# Patient Record
Sex: MALE | Race: WHITE | Employment: FULL TIME | URBAN - METROPOLITAN AREA
[De-identification: names, ages, dates, MRNs, and addresses within clinical notes are randomized per-mention and may not be internally consistent; named-entity substitution may affect disease eponyms.]

---

## 2017-09-05 PROBLEM — Z98.1 S/P LUMBAR FUSION: Status: ACTIVE | Noted: 2017-09-05

## 2018-01-02 PROBLEM — E11.9 TYPE 2 DIABETES MELLITUS WITHOUT COMPLICATION, WITHOUT LONG-TERM CURRENT USE OF INSULIN (HCC): Status: ACTIVE | Noted: 2018-01-02

## 2019-06-27 RX ORDER — ASPIRIN 325 MG
650 TABLET ORAL DAILY
Status: ON HOLD | COMMUNITY
End: 2019-07-24

## 2019-07-23 ENCOUNTER — ANESTHESIA EVENT (OUTPATIENT)
Dept: SURGERY | Facility: HOSPITAL | Age: 59
DRG: 470 | End: 2019-07-23
Payer: COMMERCIAL

## 2019-07-23 NOTE — ANESTHESIA PREPROCEDURE EVALUATION
PRE-OP EVALUATION    Patient Name: Priya PARSONSJulia Demarco (Scott)    Pre-op Diagnosis: Primary osteoarthritis of right hip [M16.11]    Procedure(s):  RIGHT LATERAL TOTAL HIP REPLACEMENT    Surgeon(s) and Role:     Memo Johnson MD - Primary    Pre-op vitals revie Endo/Other      (+) diabetes                            Pulmonary    Negative pulmonary ROS.                        Neuro/Psych  Comment: Previous lumbar fusion, multiple lumbar ESIs    (+) depression                              Past Surgical Value Date    INR 1.0 07/19/2019         Airway      Mallampati: II  Mouth opening: >3 FB  TM distance: 4 - 6 cm  Neck ROM: full Cardiovascular    Cardiovascular exam normal.         Dental    No notable dental history.          Pulmonary    Pulmonary exam

## 2019-07-24 ENCOUNTER — HOSPITAL ENCOUNTER (INPATIENT)
Facility: HOSPITAL | Age: 59
LOS: 2 days | Discharge: HOME HEALTH | DRG: 470 | End: 2019-07-26
Attending: ORTHOPAEDIC SURGERY | Admitting: ORTHOPAEDIC SURGERY
Payer: COMMERCIAL

## 2019-07-24 ENCOUNTER — ANESTHESIA (OUTPATIENT)
Dept: SURGERY | Facility: HOSPITAL | Age: 59
DRG: 470 | End: 2019-07-24
Payer: COMMERCIAL

## 2019-07-24 ENCOUNTER — APPOINTMENT (OUTPATIENT)
Dept: GENERAL RADIOLOGY | Facility: HOSPITAL | Age: 59
DRG: 470 | End: 2019-07-24
Attending: PHYSICIAN ASSISTANT
Payer: COMMERCIAL

## 2019-07-24 DIAGNOSIS — M16.11 PRIMARY OSTEOARTHRITIS OF RIGHT HIP: ICD-10-CM

## 2019-07-24 LAB
ANTIBODY SCREEN: NEGATIVE
GLUCOSE BLD-MCNC: 178 MG/DL (ref 70–99)
GLUCOSE BLD-MCNC: 194 MG/DL (ref 70–99)
GLUCOSE BLD-MCNC: 314 MG/DL (ref 70–99)
RH BLOOD TYPE: NEGATIVE

## 2019-07-24 PROCEDURE — 82962 GLUCOSE BLOOD TEST: CPT

## 2019-07-24 PROCEDURE — 86850 RBC ANTIBODY SCREEN: CPT | Performed by: ORTHOPAEDIC SURGERY

## 2019-07-24 PROCEDURE — 88311 DECALCIFY TISSUE: CPT | Performed by: ORTHOPAEDIC SURGERY

## 2019-07-24 PROCEDURE — 3E0T3BZ INTRODUCTION OF ANESTHETIC AGENT INTO PERIPHERAL NERVES AND PLEXI, PERCUTANEOUS APPROACH: ICD-10-PCS | Performed by: ANESTHESIOLOGY

## 2019-07-24 PROCEDURE — 73501 X-RAY EXAM HIP UNI 1 VIEW: CPT | Performed by: PHYSICIAN ASSISTANT

## 2019-07-24 PROCEDURE — 0SR90JA REPLACEMENT OF RIGHT HIP JOINT WITH SYNTHETIC SUBSTITUTE, UNCEMENTED, OPEN APPROACH: ICD-10-PCS | Performed by: ORTHOPAEDIC SURGERY

## 2019-07-24 PROCEDURE — 86900 BLOOD TYPING SEROLOGIC ABO: CPT | Performed by: ORTHOPAEDIC SURGERY

## 2019-07-24 PROCEDURE — 88304 TISSUE EXAM BY PATHOLOGIST: CPT | Performed by: ORTHOPAEDIC SURGERY

## 2019-07-24 PROCEDURE — 86901 BLOOD TYPING SEROLOGIC RH(D): CPT | Performed by: ORTHOPAEDIC SURGERY

## 2019-07-24 PROCEDURE — 76942 ECHO GUIDE FOR BIOPSY: CPT | Performed by: ORTHOPAEDIC SURGERY

## 2019-07-24 RX ORDER — NALOXONE HYDROCHLORIDE 0.4 MG/ML
80 INJECTION, SOLUTION INTRAMUSCULAR; INTRAVENOUS; SUBCUTANEOUS AS NEEDED
Status: DISCONTINUED | OUTPATIENT
Start: 2019-07-24 | End: 2019-07-24 | Stop reason: HOSPADM

## 2019-07-24 RX ORDER — HYDROMORPHONE HYDROCHLORIDE 1 MG/ML
0.4 INJECTION, SOLUTION INTRAMUSCULAR; INTRAVENOUS; SUBCUTANEOUS EVERY 5 MIN PRN
Status: DISCONTINUED | OUTPATIENT
Start: 2019-07-24 | End: 2019-07-24 | Stop reason: HOSPADM

## 2019-07-24 RX ORDER — HYDROCODONE BITARTRATE AND ACETAMINOPHEN 5; 325 MG/1; MG/1
1 TABLET ORAL AS NEEDED
Status: DISCONTINUED | OUTPATIENT
Start: 2019-07-24 | End: 2019-07-24 | Stop reason: HOSPADM

## 2019-07-24 RX ORDER — ACETAMINOPHEN 325 MG/1
TABLET ORAL
Status: COMPLETED
Start: 2019-07-24 | End: 2019-07-24

## 2019-07-24 RX ORDER — OXYCODONE HYDROCHLORIDE 10 MG/1
10 TABLET ORAL EVERY 4 HOURS PRN
Status: DISPENSED | OUTPATIENT
Start: 2019-07-24 | End: 2019-07-26

## 2019-07-24 RX ORDER — DIPHENHYDRAMINE HYDROCHLORIDE 50 MG/ML
25 INJECTION INTRAMUSCULAR; INTRAVENOUS ONCE AS NEEDED
Status: ACTIVE | OUTPATIENT
Start: 2019-07-24 | End: 2019-07-24

## 2019-07-24 RX ORDER — ACETAMINOPHEN 325 MG/1
650 TABLET ORAL ONCE
Status: COMPLETED | OUTPATIENT
Start: 2019-07-24 | End: 2019-07-24

## 2019-07-24 RX ORDER — KETOROLAC TROMETHAMINE 30 MG/ML
30 INJECTION, SOLUTION INTRAMUSCULAR; INTRAVENOUS EVERY 6 HOURS
Status: COMPLETED | OUTPATIENT
Start: 2019-07-24 | End: 2019-07-25

## 2019-07-24 RX ORDER — SODIUM PHOSPHATE, DIBASIC AND SODIUM PHOSPHATE, MONOBASIC 7; 19 G/133ML; G/133ML
1 ENEMA RECTAL ONCE AS NEEDED
Status: DISCONTINUED | OUTPATIENT
Start: 2019-07-24 | End: 2019-07-26

## 2019-07-24 RX ORDER — DEXTROSE MONOHYDRATE 25 G/50ML
50 INJECTION, SOLUTION INTRAVENOUS
Status: DISCONTINUED | OUTPATIENT
Start: 2019-07-24 | End: 2019-07-26

## 2019-07-24 RX ORDER — BISACODYL 10 MG
10 SUPPOSITORY, RECTAL RECTAL
Status: DISCONTINUED | OUTPATIENT
Start: 2019-07-24 | End: 2019-07-26

## 2019-07-24 RX ORDER — METOCLOPRAMIDE HYDROCHLORIDE 5 MG/ML
10 INJECTION INTRAMUSCULAR; INTRAVENOUS EVERY 6 HOURS PRN
Status: DISCONTINUED | OUTPATIENT
Start: 2019-07-24 | End: 2019-07-26

## 2019-07-24 RX ORDER — POLYETHYLENE GLYCOL 3350 17 G/17G
17 POWDER, FOR SOLUTION ORAL DAILY PRN
Status: DISCONTINUED | OUTPATIENT
Start: 2019-07-24 | End: 2019-07-26

## 2019-07-24 RX ORDER — SODIUM CHLORIDE, SODIUM LACTATE, POTASSIUM CHLORIDE, CALCIUM CHLORIDE 600; 310; 30; 20 MG/100ML; MG/100ML; MG/100ML; MG/100ML
INJECTION, SOLUTION INTRAVENOUS CONTINUOUS
Status: DISCONTINUED | OUTPATIENT
Start: 2019-07-24 | End: 2019-07-24 | Stop reason: HOSPADM

## 2019-07-24 RX ORDER — MIDAZOLAM HYDROCHLORIDE 1 MG/ML
1 INJECTION INTRAMUSCULAR; INTRAVENOUS EVERY 5 MIN PRN
Status: DISCONTINUED | OUTPATIENT
Start: 2019-07-24 | End: 2019-07-24 | Stop reason: HOSPADM

## 2019-07-24 RX ORDER — HYDROMORPHONE HYDROCHLORIDE 1 MG/ML
0.8 INJECTION, SOLUTION INTRAMUSCULAR; INTRAVENOUS; SUBCUTANEOUS EVERY 2 HOUR PRN
Status: DISCONTINUED | OUTPATIENT
Start: 2019-07-24 | End: 2019-07-26

## 2019-07-24 RX ORDER — PROCHLORPERAZINE EDISYLATE 5 MG/ML
10 INJECTION INTRAMUSCULAR; INTRAVENOUS EVERY 6 HOURS PRN
Status: DISCONTINUED | OUTPATIENT
Start: 2019-07-24 | End: 2019-07-26

## 2019-07-24 RX ORDER — CELECOXIB 200 MG/1
200 CAPSULE ORAL 2 TIMES DAILY
Qty: 60 CAPSULE | Refills: 0 | Status: SHIPPED | OUTPATIENT
Start: 2019-07-24 | End: 2019-11-13 | Stop reason: ALTCHOICE

## 2019-07-24 RX ORDER — DIPHENHYDRAMINE HYDROCHLORIDE 50 MG/ML
12.5 INJECTION INTRAMUSCULAR; INTRAVENOUS EVERY 4 HOURS PRN
Status: DISCONTINUED | OUTPATIENT
Start: 2019-07-24 | End: 2019-07-26

## 2019-07-24 RX ORDER — ACETAMINOPHEN 325 MG/1
650 TABLET ORAL 4 TIMES DAILY
Status: DISPENSED | OUTPATIENT
Start: 2019-07-24 | End: 2019-07-26

## 2019-07-24 RX ORDER — SENNOSIDES 8.6 MG
17.2 TABLET ORAL NIGHTLY
Status: DISCONTINUED | OUTPATIENT
Start: 2019-07-24 | End: 2019-07-26

## 2019-07-24 RX ORDER — HYDROMORPHONE HYDROCHLORIDE 1 MG/ML
0.4 INJECTION, SOLUTION INTRAMUSCULAR; INTRAVENOUS; SUBCUTANEOUS EVERY 2 HOUR PRN
Status: DISCONTINUED | OUTPATIENT
Start: 2019-07-24 | End: 2019-07-26

## 2019-07-24 RX ORDER — VENLAFAXINE HYDROCHLORIDE 75 MG/1
75 CAPSULE, EXTENDED RELEASE ORAL 2 TIMES DAILY
COMMUNITY
End: 2020-01-20

## 2019-07-24 RX ORDER — INSULIN ASPART 100 [IU]/ML
INJECTION, SOLUTION INTRAVENOUS; SUBCUTANEOUS ONCE
Status: COMPLETED | OUTPATIENT
Start: 2019-07-24 | End: 2019-07-24

## 2019-07-24 RX ORDER — CEFAZOLIN SODIUM/WATER 2 G/20 ML
2 SYRINGE (ML) INTRAVENOUS ONCE
Status: COMPLETED | OUTPATIENT
Start: 2019-07-24 | End: 2019-07-24

## 2019-07-24 RX ORDER — ONDANSETRON 2 MG/ML
4 INJECTION INTRAMUSCULAR; INTRAVENOUS EVERY 4 HOURS PRN
Status: DISCONTINUED | OUTPATIENT
Start: 2019-07-24 | End: 2019-07-26

## 2019-07-24 RX ORDER — INSULIN ASPART 100 [IU]/ML
INJECTION, SOLUTION INTRAVENOUS; SUBCUTANEOUS
Status: COMPLETED
Start: 2019-07-24 | End: 2019-07-24

## 2019-07-24 RX ORDER — LISINOPRIL 10 MG/1
10 TABLET ORAL DAILY
COMMUNITY
End: 2019-11-19 | Stop reason: ALTCHOICE

## 2019-07-24 RX ORDER — SODIUM CHLORIDE, SODIUM LACTATE, POTASSIUM CHLORIDE, CALCIUM CHLORIDE 600; 310; 30; 20 MG/100ML; MG/100ML; MG/100ML; MG/100ML
INJECTION, SOLUTION INTRAVENOUS CONTINUOUS
Status: DISCONTINUED | OUTPATIENT
Start: 2019-07-24 | End: 2019-07-26

## 2019-07-24 RX ORDER — CLONAZEPAM 0.5 MG/1
0.5 TABLET ORAL NIGHTLY PRN
COMMUNITY
End: 2019-10-13

## 2019-07-24 RX ORDER — HYDROCODONE BITARTRATE AND ACETAMINOPHEN 5; 325 MG/1; MG/1
1 TABLET ORAL EVERY 6 HOURS PRN
Qty: 40 TABLET | Refills: 0 | Status: SHIPPED | OUTPATIENT
Start: 2019-07-24 | End: 2019-11-13 | Stop reason: ALTCHOICE

## 2019-07-24 RX ORDER — ATORVASTATIN CALCIUM 10 MG/1
10 TABLET, FILM COATED ORAL 2 TIMES DAILY
Status: DISCONTINUED | OUTPATIENT
Start: 2019-07-24 | End: 2019-07-26

## 2019-07-24 RX ORDER — DIPHENHYDRAMINE HCL 25 MG
25 CAPSULE ORAL EVERY 4 HOURS PRN
Status: DISCONTINUED | OUTPATIENT
Start: 2019-07-24 | End: 2019-07-26

## 2019-07-24 RX ORDER — ACETAMINOPHEN 500 MG
1000 TABLET ORAL EVERY 6 HOURS PRN
Status: ON HOLD | COMMUNITY
End: 2019-07-24

## 2019-07-24 RX ORDER — DEXTROSE MONOHYDRATE 25 G/50ML
50 INJECTION, SOLUTION INTRAVENOUS
Status: DISCONTINUED | OUTPATIENT
Start: 2019-07-24 | End: 2019-07-24 | Stop reason: HOSPADM

## 2019-07-24 RX ORDER — MEPERIDINE HYDROCHLORIDE 25 MG/ML
12.5 INJECTION INTRAMUSCULAR; INTRAVENOUS; SUBCUTANEOUS AS NEEDED
Status: DISCONTINUED | OUTPATIENT
Start: 2019-07-24 | End: 2019-07-24 | Stop reason: HOSPADM

## 2019-07-24 RX ORDER — OXYCODONE HYDROCHLORIDE 5 MG/1
5 TABLET ORAL EVERY 4 HOURS PRN
Status: DISPENSED | OUTPATIENT
Start: 2019-07-24 | End: 2019-07-26

## 2019-07-24 RX ORDER — ONDANSETRON 2 MG/ML
4 INJECTION INTRAMUSCULAR; INTRAVENOUS AS NEEDED
Status: DISCONTINUED | OUTPATIENT
Start: 2019-07-24 | End: 2019-07-24 | Stop reason: HOSPADM

## 2019-07-24 RX ORDER — CEFAZOLIN SODIUM/WATER 2 G/20 ML
2 SYRINGE (ML) INTRAVENOUS EVERY 8 HOURS
Status: COMPLETED | OUTPATIENT
Start: 2019-07-24 | End: 2019-07-25

## 2019-07-24 RX ORDER — SCOLOPAMINE TRANSDERMAL SYSTEM 1 MG/1
1 PATCH, EXTENDED RELEASE TRANSDERMAL ONCE
Status: DISCONTINUED | OUTPATIENT
Start: 2019-07-24 | End: 2019-07-26

## 2019-07-24 RX ORDER — OXYCODONE HYDROCHLORIDE 15 MG/1
15 TABLET ORAL EVERY 4 HOURS PRN
Status: ACTIVE | OUTPATIENT
Start: 2019-07-24 | End: 2019-07-26

## 2019-07-24 RX ORDER — CLONAZEPAM 0.5 MG/1
0.5 TABLET ORAL NIGHTLY PRN
Status: DISCONTINUED | OUTPATIENT
Start: 2019-07-24 | End: 2019-07-26

## 2019-07-24 RX ORDER — DOCUSATE SODIUM 100 MG/1
100 CAPSULE, LIQUID FILLED ORAL 2 TIMES DAILY
Status: DISCONTINUED | OUTPATIENT
Start: 2019-07-24 | End: 2019-07-26

## 2019-07-24 RX ORDER — ACETAMINOPHEN 500 MG
1000 TABLET ORAL ONCE
Status: DISCONTINUED | OUTPATIENT
Start: 2019-07-24 | End: 2019-07-24 | Stop reason: HOSPADM

## 2019-07-24 RX ORDER — HYDROMORPHONE HYDROCHLORIDE 1 MG/ML
0.2 INJECTION, SOLUTION INTRAMUSCULAR; INTRAVENOUS; SUBCUTANEOUS EVERY 2 HOUR PRN
Status: DISCONTINUED | OUTPATIENT
Start: 2019-07-24 | End: 2019-07-26

## 2019-07-24 RX ORDER — HYDROCODONE BITARTRATE AND ACETAMINOPHEN 5; 325 MG/1; MG/1
2 TABLET ORAL AS NEEDED
Status: DISCONTINUED | OUTPATIENT
Start: 2019-07-24 | End: 2019-07-24 | Stop reason: HOSPADM

## 2019-07-24 RX ORDER — SODIUM CHLORIDE 9 MG/ML
INJECTION, SOLUTION INTRAVENOUS CONTINUOUS
Status: DISCONTINUED | OUTPATIENT
Start: 2019-07-24 | End: 2019-07-26

## 2019-07-24 RX ORDER — MIDAZOLAM HYDROCHLORIDE 1 MG/ML
INJECTION INTRAMUSCULAR; INTRAVENOUS
Status: COMPLETED
Start: 2019-07-24 | End: 2019-07-24

## 2019-07-24 RX ORDER — TIZANIDINE 4 MG/1
4 TABLET ORAL 3 TIMES DAILY PRN
Status: DISCONTINUED | OUTPATIENT
Start: 2019-07-24 | End: 2019-07-26

## 2019-07-24 RX ORDER — LABETALOL HYDROCHLORIDE 5 MG/ML
5 INJECTION, SOLUTION INTRAVENOUS EVERY 5 MIN PRN
Status: DISCONTINUED | OUTPATIENT
Start: 2019-07-24 | End: 2019-07-24 | Stop reason: HOSPADM

## 2019-07-24 RX ORDER — DOCUSATE SODIUM 100 MG/1
100 CAPSULE, LIQUID FILLED ORAL 2 TIMES DAILY
Qty: 60 CAPSULE | Refills: 0 | Status: SHIPPED | OUTPATIENT
Start: 2019-07-24 | End: 2019-11-13 | Stop reason: ALTCHOICE

## 2019-07-24 NOTE — INTERVAL H&P NOTE
Pre-op Diagnosis: Primary osteoarthritis of right hip [M16.11]    The above referenced H&P was reviewed by Dora Jimenez MD on 7/24/2019, the patient was examined and no significant changes have occurred in the patient's condition since the H&P was performed

## 2019-07-24 NOTE — OPERATIVE REPORT
TOTAL HIP REPLACEMENT OPERATIVE REPORT    Galindo Demarco (Scott)       [id number]     12/16/1960      TOTAL HIP REPLACEMENT  PRE-OP DX:  RIGHT HIP PRIMARY OSTEOARTHRITIS  POST-OP DX:  RIGHT HIP PRIMARY OSTEOARTHRITIS  PROCEDURE: RIGHT  LATERAL TOTAL HIP REP ABDUCTION AND 15 DEG ANTEVERSION. IT WAS A STABLE FIT. OSTEOPHYTE WAS REMOVED. TRIAL LINER WAS PLACED. FEMORAL EXPOSURE WAS OBTAINED WITH A TROCHANTERIC RETRACTOR AND MEDIAL NECK RETRACTOR. A STARTING AWL WAS USED TO FIND THE STARTING HOLE.   Nika Montelongo

## 2019-07-24 NOTE — PROGRESS NOTES
Jean Demarco (Scott)   6/19/2019 2:50 PM   Office Visit   MRN:  [id number]   Description: 60year old male Provider: Rowan Boast, MD Department: [facility]   Scanning Cover Sheet     Click to print Cristhian Circe 852 for scanning   [facility] LISINOPRIL 10 MG Oral Tab TAKE 1 TABLET BY MOUTH EVERY DAY Disp: 90 tablet Rfl: 1   VENLAFAXINE HCL ER 75 MG Oral Capsule SR 24 Hr TAKE 1 CAPSULE(75 MG) BY MOUTH TWICE DAILY Disp: 180 capsule Rfl: 1   aspirin (SB LOW DOSE ASA EC) 81 MG Oral Tab EC Take 81 No distress. Alert and oriented. Right lower extremity:  He has abdominal pannus hanging over the anterior aspect of his hip. Hip skin is intact. No swelling, warmth, or erythema. No tenderness anywhere. Thigh is soft.   ROM: From 20 to 90 degrees o -     [address]

## 2019-07-24 NOTE — OR PREOP
Pt stated he forgot to stop his ASA 325mg until 7/22. He said he was taking 2 a day not one.  here now to talk to pt regarding risk of bleeding with not stopping the ASA. Pt decided to do the surgery despite the risk.

## 2019-07-25 LAB
DEPRECATED RDW RBC AUTO: 42.3 FL (ref 35.1–46.3)
ERYTHROCYTE [DISTWIDTH] IN BLOOD BY AUTOMATED COUNT: 13 % (ref 11–15)
EST. AVERAGE GLUCOSE BLD GHB EST-MCNC: 177 MG/DL (ref 68–126)
GLUCOSE BLD-MCNC: 190 MG/DL (ref 70–99)
GLUCOSE BLD-MCNC: 256 MG/DL (ref 70–99)
GLUCOSE BLD-MCNC: 306 MG/DL (ref 70–99)
GLUCOSE BLD-MCNC: 322 MG/DL (ref 70–99)
HBA1C MFR BLD HPLC: 7.8 % (ref ?–5.7)
HCT VFR BLD AUTO: 34.7 % (ref 39–53)
HGB BLD-MCNC: 11.6 G/DL (ref 13–17.5)
MCH RBC QN AUTO: 30.1 PG (ref 26–34)
MCHC RBC AUTO-ENTMCNC: 33.4 G/DL (ref 31–37)
MCV RBC AUTO: 90.1 FL (ref 80–100)
PLATELET # BLD AUTO: 184 10(3)UL (ref 150–450)
RBC # BLD AUTO: 3.85 X10(6)UL (ref 4.3–5.7)
WBC # BLD AUTO: 11.7 X10(3) UL (ref 4–11)

## 2019-07-25 PROCEDURE — 82962 GLUCOSE BLOOD TEST: CPT

## 2019-07-25 PROCEDURE — 97165 OT EVAL LOW COMPLEX 30 MIN: CPT

## 2019-07-25 PROCEDURE — 97535 SELF CARE MNGMENT TRAINING: CPT

## 2019-07-25 PROCEDURE — 97116 GAIT TRAINING THERAPY: CPT

## 2019-07-25 PROCEDURE — 97150 GROUP THERAPEUTIC PROCEDURES: CPT

## 2019-07-25 PROCEDURE — 83036 HEMOGLOBIN GLYCOSYLATED A1C: CPT | Performed by: HOSPITALIST

## 2019-07-25 PROCEDURE — 97161 PT EVAL LOW COMPLEX 20 MIN: CPT

## 2019-07-25 PROCEDURE — 85027 COMPLETE CBC AUTOMATED: CPT | Performed by: ORTHOPAEDIC SURGERY

## 2019-07-25 RX ORDER — LISINOPRIL 10 MG/1
10 TABLET ORAL DAILY
Status: DISCONTINUED | OUTPATIENT
Start: 2019-07-25 | End: 2019-07-26

## 2019-07-25 RX ORDER — HYDROCODONE BITARTRATE AND ACETAMINOPHEN 5; 325 MG/1; MG/1
2 TABLET ORAL EVERY 4 HOURS PRN
Status: DISCONTINUED | OUTPATIENT
Start: 2019-07-26 | End: 2019-07-26

## 2019-07-25 RX ORDER — HYDROCODONE BITARTRATE AND ACETAMINOPHEN 5; 325 MG/1; MG/1
1 TABLET ORAL EVERY 4 HOURS PRN
Status: DISCONTINUED | OUTPATIENT
Start: 2019-07-26 | End: 2019-07-26

## 2019-07-25 NOTE — HOME CARE LIAISON
MET WITH PTNT AND OFFERED CHOICE  OF AGENCIES. PTNT AGREEABLE TO [facility]. MET WITH PTNT TO DISCUSS HOME HEALTH SERVICES AND COVERAGE CRITERIA. PTNT AGREEABLE TO [facility]. PTNT GIVEN RESIDENTIAL BROCHURE.  RESIDENTIAL WITH PROVIDE SN/PT ON DISC

## 2019-07-25 NOTE — PHYSICAL THERAPY NOTE
PHYSICAL THERAPY HIP TREATMENT NOTE - INPATIENT      Room Number: 380/380-A     Session: 1&2            Problem List  Active Problems:    Primary osteoarthritis of right hip      Past Medical History   Past Medical History:   Diagnosis Date   • Anesthesia AFOs)    WEIGHT BEARING RESTRICTION  Weight Bearing Restriction: R lower extremity        R Lower Extremity: Weight Bearing as Tolerated       PAIN ASSESSMENT   Rating: Unable to rate  Location: R hip  Management Techniques: Activity promotion; Body mechani discomfort. PM: Pt performed seated and standing therex per PADMINI protocol with increased reps. Pt performed stair training, stoop  and car/tub t/f  training c cues for sequencing and CGA assist.  Pt able to recall 3/3 hip precautions.     Pt gait trained assistive device and supervision  Progressing, met at [place] [id number]    Goal #5     Patient verbalizes and/or demonstrates all precautions and safety concerns independently met    Goal #6           Goal Comments: Goals established on 7/25/2019

## 2019-07-25 NOTE — OCCUPATIONAL THERAPY NOTE
OCCUPATIONAL THERAPY QUICK EVALUATION - INPATIENT    Room Number: 380/380-A  Evaluation Date: 7/25/2019     Type of Evaluation: Quick Eval  Presenting Problem: s/p R PADMINI 7/24/19    Physician Order: IP Consult to Occupational Therapy  Reason for Therapy:  A TRANSFORAMINAL EPIDURAL - LUMBAR N/A 7/26/2012    Performed by Jacob Robertson MD at [facility]   • TRANSFORAMINAL EPIDURAL - LUMBAR N/A 7/11/2012    Performed by Jacob Robertson MD at [facility] body clothing?: None  -   Taking care of personal grooming such as brushing teeth?: None  -   Eating meals?: None    AM-PAC Score:  Score: 21  Approx Degree of Impairment: 32.79%  Standardized Score (AM-PAC Scale): 44.27  CMS Modifier (G-Code): DEISI SCHRADER review of medical or therapy records    Specific performance deficits impacting engagement in ADL/IADL  LOW  1 - 3 performance deficits    Client Assessment/Performance Deficits  LOW - No comorbidities nor modifications of tasks    Clinical Decision Making

## 2019-07-25 NOTE — PROGRESS NOTES
Orthopedic surgery progress note    Vilma Demarco (Scott) Patient Status:  Inpatient    1960 MRN [id number]   [facility] 3SW-A Attending Amarjit Saleh MD   Hosp Day # 1 PCP Sammie Gaines MD       Subjective:  No major complaints.

## 2019-07-25 NOTE — PLAN OF CARE
Vitals stable, pain well controlled on PO pain meds, A & O X 4, blood sugar elevated, on SS insulin, no insulin at home. Discussed s/s of hypoglycemia and etiology for elevated BS, verbalizes understanding. Jesus Foot drop, not new finding.   Pt reports he

## 2019-07-25 NOTE — PHYSICAL THERAPY NOTE
PHYSICAL THERAPY HIP EVALUATION - INPATIENT     Room Number: 380/380-A  Evaluation Date: 7/25/2019  Type of Evaluation: Initial  Physician Order: PT Eval and Treat    Presenting Problem: s/p anterior R PADMINI 7/24/19  Reason for Therapy: Mobility Dysfunction TRANSFORAMINAL EPIDURAL - LUMBAR N/A 7/11/2012    Performed by Shama Sparks MD at [address]  Type of Home: House   Home Layout: Two level  Stairs to Enter : 2  Railing: Yes  Stairs to Bedroom: 12  Railing:  Yes Turning over in bed (including adjusting bedclothes, sheets and blankets)?: None   -   Sitting down on and standing up from a chair with arms (e.g., wheelchair, bedside commode, etc.): None   -   Moving from lying on back to sitting on the side of the bed? training    Patient End of Session: Up in chair;Needs met;Call light within reach;RN aware of session/findings; All patient questions and concerns addressed    ASSESSMENT   Patient is a 60year old male admitted on 7/24/2019 for anterior R PADMINI.  Pertinent co will negotiate 4 stairs/one curb w/ assistive device and supervision   Goal #5    Patient verbalizes and/or demonstrates all precautions and safety concerns independently   Goal #6        Goal Comments: Goals established on 7/25/2019

## 2019-07-25 NOTE — PLAN OF CARE
Arrived via bed. Right hip dressing aquacel clean, dry, intact. Abductor pillow & gel ice pack in place. States history of bilateral foot drop due to back surgery,  unable to dorsi flex bilateral feet. Bilateral pedal pulses palpable. Due to void.   In

## 2019-07-25 NOTE — PROGRESS NOTES
Post Op Day 1 Ortho Note    Status Post Nerve Block:  Type of Nerve Block: Right Fascia Iliaca PADMINI  Single Injection Nerve Block    Post op review: No evidence of immediate block related complications, No paresthesia noted, Able to lift leg(s), Able to dorie

## 2019-07-25 NOTE — PLAN OF CARE
PT A/O, 98% ON 2L FOR NIGHT, 94% ON RA THIS AM, SCDS ON, VOIDING IN URINAL, TAKING SCHEDULED TYLENOL/TORADOL WITH GOOD PAIN CONTROL, DENIES N/T, SAME KRYSTINA FOOT DROP AS PREOP, AQUACEL DRESSING D/I, SL, ACCUCHECKS, STARTED ELIQUIS, INSTRUCTED PT ON POC    Pro

## 2019-07-25 NOTE — CM/SW NOTE
PT recs HHC at [place]. Pt referred to [facility] and they have accepted and pt has agreed to Silvio Finn. Kalina Chamberlain, [facility] Worker/Dischage Planner  [phone]  Pager [phone]

## 2019-07-25 NOTE — CONSULTS
General Medicine Consult      Reason for consult: medical management    Consulted by: Dr. Alyson Gamino    PCP: Emani Brown MD      History of Present Illness: Patient is a 60year old male with mmp including but not limited to HTN, DMII, anxiety/depressio Medications Marked as Taking for the 7/24/19 encounter [facility] Encounter):  lisinopril 10 MG Oral Tab Take 10 mg by mouth daily. Disp:  Rfl:    clonazePAM 0.5 MG Oral Tab Take 0.5 mg by mouth nightly as needed for Anxiety.  Disp:  Rfl:    Venlafaxine HCl docusate sodium  100 mg Oral BID   • apixaban  2.5 mg Oral BID   • mupirocin  1 Application Nasal BID   • atorvastatin  10 mg Oral BID   • Venlafaxine HCl ER  75 mg Oral BID   • Insulin Aspart Pen  1-5 Units Subcutaneous TID CC and HS     Continuous Infusi non-tender. Bowel sounds normal. Non distended, no overt hernias   Extremities: Extremities normal, atraumatic, no  edema. Skin: Skin color, texture, turgor normal. No visible rashes or lesions.     Neurologic:  Psychiatric: Has b/l foot droop (chronic)

## 2019-07-26 VITALS
OXYGEN SATURATION: 95 % | TEMPERATURE: 98 F | HEIGHT: 70 IN | HEART RATE: 84 BPM | DIASTOLIC BLOOD PRESSURE: 58 MMHG | RESPIRATION RATE: 17 BRPM | BODY MASS INDEX: 35.85 KG/M2 | SYSTOLIC BLOOD PRESSURE: 114 MMHG | WEIGHT: 250.44 LBS

## 2019-07-26 LAB
DEPRECATED RDW RBC AUTO: 44.3 FL (ref 35.1–46.3)
ERYTHROCYTE [DISTWIDTH] IN BLOOD BY AUTOMATED COUNT: 13.2 % (ref 11–15)
GLUCOSE BLD-MCNC: 205 MG/DL (ref 70–99)
HCT VFR BLD AUTO: 33.2 % (ref 39–53)
HGB BLD-MCNC: 11 G/DL (ref 13–17.5)
MCH RBC QN AUTO: 30.5 PG (ref 26–34)
MCHC RBC AUTO-ENTMCNC: 33.1 G/DL (ref 31–37)
MCV RBC AUTO: 92 FL (ref 80–100)
PLATELET # BLD AUTO: 144 10(3)UL (ref 150–450)
RBC # BLD AUTO: 3.61 X10(6)UL (ref 4.3–5.7)
WBC # BLD AUTO: 9 X10(3) UL (ref 4–11)

## 2019-07-26 PROCEDURE — 85027 COMPLETE CBC AUTOMATED: CPT | Performed by: ORTHOPAEDIC SURGERY

## 2019-07-26 PROCEDURE — 82962 GLUCOSE BLOOD TEST: CPT

## 2019-07-26 PROCEDURE — 97150 GROUP THERAPEUTIC PROCEDURES: CPT

## 2019-07-26 PROCEDURE — 97116 GAIT TRAINING THERAPY: CPT

## 2019-07-26 NOTE — PHYSICAL THERAPY NOTE
PHYSICAL THERAPY HIP TREATMENT NOTE - INPATIENT      Room Number: 380/380-A     Session: 3  Number of Visits to Meet Established Goals: 4    Presenting Problem: s/p anterior R PADMINI 7/24/19    Problem List  Active Problems:    Primary osteoarthritis of right anterior;Hip abduction pillow(B drop foot, doesn't wear AFOs)    WEIGHT BEARING RESTRICTION  Weight Bearing Restriction: R lower extremity        R Lower Extremity: Weight Bearing as Tolerated       PAIN ASSESSMENT   Rating: Unable to rate  Location: R helio verbalize and demonstrate all surgical precautions. Pt wheeled back to room, needs met.          Exercises AM Session    Ankle Pumps     20 reps    Quad Sets 20 reps    Glut Sets 20 reps    Hip Abd/Add 20 reps    Heel slides 20 reps    Saq 20 reps    Sitt demonstrates all precautions and safety concerns independently met    Goal #6           Goal Comments: Goals established on 7/25/2019  Adequate for d/c

## 2019-07-26 NOTE — PROGRESS NOTES
Post Op Day 2 Ortho Note: Right Lateral PADMINI    Assessed patient in chair. Rates pain 4/10 at rest and 5-6/10 with activity. Machelle Gonzalez is working well to manage pain; denies itching/nausea/dizziness.     Able to bear weight on sx leg; equal sensation in B

## 2019-07-26 NOTE — PLAN OF CARE
Pt ok to dc home with hh per ortho md & im md. Per [facility] set up. Ptp & wife verbalized understanding of poc & dc instructions written instructions given with rx for pain meds. Bm last night, refusing laxative, voiding in bathroom.  Up with min assist, t

## 2019-07-26 NOTE — CM/SW NOTE
[facility]  P:[phone]  F:[phone] by family car. Liaison aware.     Ashly Davis LCSW

## 2019-07-26 NOTE — PROGRESS NOTES
[facility] Hospitalist Progress Note     PCP: Anirudh Huynh MD    CC:  Follow up    SUBJECTIVE:  Pt sitting up in bed, pain controlled. No cp/sob/n/v/f/c. +U, +flatus.  Martha Terrazas for d/c    OBJECTIVE:  Temp:  [98.1 °F (36.7 °C)-98.8 °F (37.1 °C)] 98.1 °F (36.7 Insulin Aspart Pen  1-5 Units Subcutaneous TID CC and HS     • lactated ringers 20 mL/hr at 07/24/19 1211   • sodium chloride 125 mL/hr at 07/24/19 6137     HYDROcodone-acetaminophen **OR** HYDROcodone-acetaminophen, tiZANidine HCl, HYDROmorphone HCl **OR*

## 2019-07-26 NOTE — PLAN OF CARE
AOX4. Hx of numbness and foot drop on BLE d/t hx of back surgery x 3 in the past. Patient able to plantar flex yet unable to do dorsi flexion. Aquacel dressing is C/D/I, ice gel maintained. Is encouraged. SCDs maintained bilaterally. PT/OT.  Plan is to go h

## 2019-07-28 NOTE — DISCHARGE SUMMARY
Discharge Summary  Patient ID:  Manuel RHODESCullenRenzo Demarco  [id number]  60year old  12/16/1960    Admit date: 7/24/2019    Discharge date and time: 7/26/19    Attending Physician: No att. providers found     Reason for admission: right hip primary OA    Discha Capsule SR 24 Hr  Take 75 mg by mouth 2 (two) times daily. , Historical    mupirocin 2 % External Ointment  Use cotton swab to place a pea size amount of ointment. Apply topically to inside your nose twice daily. , Normal, Disp-15 g, R-0    SITagliptin Phos

## 2019-11-13 RX ORDER — SODIUM CHLORIDE, SODIUM LACTATE, POTASSIUM CHLORIDE, CALCIUM CHLORIDE 600; 310; 30; 20 MG/100ML; MG/100ML; MG/100ML; MG/100ML
INJECTION, SOLUTION INTRAVENOUS CONTINUOUS
Status: CANCELLED | OUTPATIENT
Start: 2019-11-13

## 2019-12-11 ENCOUNTER — ANESTHESIA EVENT (OUTPATIENT)
Dept: SURGERY | Facility: HOSPITAL | Age: 59
DRG: 455 | End: 2019-12-11
Payer: COMMERCIAL

## 2019-12-12 ENCOUNTER — ANESTHESIA (OUTPATIENT)
Dept: SURGERY | Facility: HOSPITAL | Age: 59
DRG: 455 | End: 2019-12-12
Payer: COMMERCIAL

## 2019-12-12 ENCOUNTER — APPOINTMENT (OUTPATIENT)
Dept: GENERAL RADIOLOGY | Facility: HOSPITAL | Age: 59
DRG: 455 | End: 2019-12-12
Attending: ORTHOPAEDIC SURGERY
Payer: COMMERCIAL

## 2019-12-12 ENCOUNTER — HOSPITAL ENCOUNTER (INPATIENT)
Facility: HOSPITAL | Age: 59
LOS: 2 days | Discharge: HOME | DRG: 455 | End: 2019-12-14
Attending: ORTHOPAEDIC SURGERY | Admitting: ORTHOPAEDIC SURGERY
Payer: COMMERCIAL

## 2019-12-12 DIAGNOSIS — M40.205 KYPHOSIS OF THORACOLUMBAR REGION, UNSPECIFIED KYPHOSIS TYPE: ICD-10-CM

## 2019-12-12 PROCEDURE — 95939 C MOTOR EVOKED UPR&LWR LIMBS: CPT | Performed by: ORTHOPAEDIC SURGERY

## 2019-12-12 PROCEDURE — 88300 SURGICAL PATH GROSS: CPT | Performed by: ORTHOPAEDIC SURGERY

## 2019-12-12 PROCEDURE — 0SP004Z REMOVAL OF INTERNAL FIXATION DEVICE FROM LUMBAR VERTEBRAL JOINT, OPEN APPROACH: ICD-10-PCS | Performed by: ORTHOPAEDIC SURGERY

## 2019-12-12 PROCEDURE — 0ST40ZZ RESECTION OF LUMBOSACRAL DISC, OPEN APPROACH: ICD-10-PCS | Performed by: ORTHOPAEDIC SURGERY

## 2019-12-12 PROCEDURE — 95861 NEEDLE EMG 2 EXTREMITIES: CPT | Performed by: ORTHOPAEDIC SURGERY

## 2019-12-12 PROCEDURE — 0SG1071 FUSION OF 2 OR MORE LUMBAR VERTEBRAL JOINTS WITH AUTOLOGOUS TISSUE SUBSTITUTE, POSTERIOR APPROACH, POSTERIOR COLUMN, OPEN APPROACH: ICD-10-PCS | Performed by: ORTHOPAEDIC SURGERY

## 2019-12-12 PROCEDURE — 95938 SOMATOSENSORY TESTING: CPT | Performed by: ORTHOPAEDIC SURGERY

## 2019-12-12 PROCEDURE — 82962 GLUCOSE BLOOD TEST: CPT

## 2019-12-12 PROCEDURE — 95940 IONM IN OPERATNG ROOM 15 MIN: CPT | Performed by: ORTHOPAEDIC SURGERY

## 2019-12-12 PROCEDURE — 01NB0ZZ RELEASE LUMBAR NERVE, OPEN APPROACH: ICD-10-PCS | Performed by: ORTHOPAEDIC SURGERY

## 2019-12-12 PROCEDURE — 76000 FLUOROSCOPY <1 HR PHYS/QHP: CPT | Performed by: ORTHOPAEDIC SURGERY

## 2019-12-12 PROCEDURE — 4A11X4G MONITORING OF PERIPHERAL NERVOUS ELECTRICAL ACTIVITY, INTRAOPERATIVE, EXTERNAL APPROACH: ICD-10-PCS | Performed by: ORTHOPAEDIC SURGERY

## 2019-12-12 PROCEDURE — 0SG3071 FUSION OF LUMBOSACRAL JOINT WITH AUTOLOGOUS TISSUE SUBSTITUTE, POSTERIOR APPROACH, POSTERIOR COLUMN, OPEN APPROACH: ICD-10-PCS | Performed by: ORTHOPAEDIC SURGERY

## 2019-12-12 PROCEDURE — 0SP304Z REMOVAL OF INTERNAL FIXATION DEVICE FROM LUMBOSACRAL JOINT, OPEN APPROACH: ICD-10-PCS | Performed by: ORTHOPAEDIC SURGERY

## 2019-12-12 PROCEDURE — 0SG10A0 FUSION OF 2 OR MORE LUMBAR VERTEBRAL JOINTS WITH INTERBODY FUSION DEVICE, ANTERIOR APPROACH, ANTERIOR COLUMN, OPEN APPROACH: ICD-10-PCS | Performed by: ORTHOPAEDIC SURGERY

## 2019-12-12 PROCEDURE — 01NR0ZZ RELEASE SACRAL NERVE, OPEN APPROACH: ICD-10-PCS | Performed by: ORTHOPAEDIC SURGERY

## 2019-12-12 PROCEDURE — 0ST20ZZ RESECTION OF LUMBAR VERTEBRAL DISC, OPEN APPROACH: ICD-10-PCS | Performed by: ORTHOPAEDIC SURGERY

## 2019-12-12 DEVICE — BONE GRAFT KIT 7510200 INFUSE SMALL
Type: IMPLANTABLE DEVICE | Status: FUNCTIONAL
Brand: INFUSE® BONE GRAFT

## 2019-12-12 DEVICE — RELINE MAS COCR ROD 5.5X300 ST: Type: IMPLANTABLE DEVICE | Site: BACK | Status: FUNCTIONAL

## 2019-12-12 DEVICE — IMPLANTABLE DEVICE: Type: IMPLANTABLE DEVICE | Site: BACK | Status: FUNCTIONAL

## 2019-12-12 DEVICE — RELINE LCK SCRW 5.5 OPEN TULIP: Type: IMPLANTABLE DEVICE | Site: BACK | Status: FUNCTIONAL

## 2019-12-12 DEVICE — OSTEOCEL PRO LARGE BULK BUY: Type: IMPLANTABLE DEVICE | Status: FUNCTIONAL

## 2019-12-12 DEVICE — RELINE MAS RED SCREW 7.5X50 2C: Type: IMPLANTABLE DEVICE | Site: BACK | Status: FUNCTIONAL

## 2019-12-12 DEVICE — RELINE MAS RED SCRW 6.5X50 2C: Type: IMPLANTABLE DEVICE | Site: BACK | Status: FUNCTIONAL

## 2019-12-12 RX ORDER — INSULIN ASPART 100 [IU]/ML
INJECTION, SOLUTION INTRAVENOUS; SUBCUTANEOUS
Status: DISCONTINUED
Start: 2019-12-12 | End: 2019-12-12 | Stop reason: WASHOUT

## 2019-12-12 RX ORDER — DEXTROSE MONOHYDRATE 25 G/50ML
50 INJECTION, SOLUTION INTRAVENOUS
Status: DISCONTINUED | OUTPATIENT
Start: 2019-12-12 | End: 2019-12-14

## 2019-12-12 RX ORDER — CEFAZOLIN SODIUM/WATER 2 G/20 ML
2 SYRINGE (ML) INTRAVENOUS EVERY 8 HOURS
Status: COMPLETED | OUTPATIENT
Start: 2019-12-12 | End: 2019-12-13

## 2019-12-12 RX ORDER — HYDROMORPHONE HYDROCHLORIDE 1 MG/ML
0.8 INJECTION, SOLUTION INTRAMUSCULAR; INTRAVENOUS; SUBCUTANEOUS EVERY 2 HOUR PRN
Status: DISCONTINUED | OUTPATIENT
Start: 2019-12-12 | End: 2019-12-14

## 2019-12-12 RX ORDER — ATORVASTATIN CALCIUM 10 MG/1
20 TABLET, FILM COATED ORAL NIGHTLY
COMMUNITY
End: 2020-03-09

## 2019-12-12 RX ORDER — LISINOPRIL 10 MG/1
10 TABLET ORAL DAILY
Status: DISCONTINUED | OUTPATIENT
Start: 2019-12-13 | End: 2019-12-14

## 2019-12-12 RX ORDER — BISACODYL 10 MG
10 SUPPOSITORY, RECTAL RECTAL
Status: DISCONTINUED | OUTPATIENT
Start: 2019-12-12 | End: 2019-12-14

## 2019-12-12 RX ORDER — OXYCODONE HYDROCHLORIDE 10 MG/1
10 TABLET ORAL EVERY 4 HOURS PRN
Status: DISCONTINUED | OUTPATIENT
Start: 2019-12-12 | End: 2019-12-13

## 2019-12-12 RX ORDER — DEXTROSE MONOHYDRATE 25 G/50ML
50 INJECTION, SOLUTION INTRAVENOUS
Status: DISCONTINUED | OUTPATIENT
Start: 2019-12-12 | End: 2019-12-12 | Stop reason: HOSPADM

## 2019-12-12 RX ORDER — BACITRACIN 50000 [USP'U]/1
INJECTION, POWDER, LYOPHILIZED, FOR SOLUTION INTRAMUSCULAR AS NEEDED
Status: DISCONTINUED | OUTPATIENT
Start: 2019-12-12 | End: 2019-12-12 | Stop reason: HOSPADM

## 2019-12-12 RX ORDER — VENLAFAXINE HYDROCHLORIDE 75 MG/1
75 CAPSULE, EXTENDED RELEASE ORAL 2 TIMES DAILY
Status: DISCONTINUED | OUTPATIENT
Start: 2019-12-12 | End: 2019-12-14

## 2019-12-12 RX ORDER — CELECOXIB 200 MG/1
CAPSULE ORAL
Status: DISPENSED
Start: 2019-12-12 | End: 2019-12-12

## 2019-12-12 RX ORDER — HYDROMORPHONE HYDROCHLORIDE 1 MG/ML
0.2 INJECTION, SOLUTION INTRAMUSCULAR; INTRAVENOUS; SUBCUTANEOUS EVERY 2 HOUR PRN
Status: DISCONTINUED | OUTPATIENT
Start: 2019-12-12 | End: 2019-12-14

## 2019-12-12 RX ORDER — DOCUSATE SODIUM 100 MG/1
100 CAPSULE, LIQUID FILLED ORAL 2 TIMES DAILY
Status: DISCONTINUED | OUTPATIENT
Start: 2019-12-12 | End: 2019-12-14

## 2019-12-12 RX ORDER — ONDANSETRON 2 MG/ML
INJECTION INTRAMUSCULAR; INTRAVENOUS
Status: DISPENSED
Start: 2019-12-12 | End: 2019-12-12

## 2019-12-12 RX ORDER — CEFAZOLIN SODIUM/WATER 2 G/20 ML
2 SYRINGE (ML) INTRAVENOUS ONCE
Status: COMPLETED | OUTPATIENT
Start: 2019-12-12 | End: 2019-12-12

## 2019-12-12 RX ORDER — ONDANSETRON 2 MG/ML
4 INJECTION INTRAMUSCULAR; INTRAVENOUS ONCE
Status: COMPLETED | OUTPATIENT
Start: 2019-12-12 | End: 2019-12-12

## 2019-12-12 RX ORDER — HYDROMORPHONE HYDROCHLORIDE 1 MG/ML
0.4 INJECTION, SOLUTION INTRAMUSCULAR; INTRAVENOUS; SUBCUTANEOUS EVERY 2 HOUR PRN
Status: DISCONTINUED | OUTPATIENT
Start: 2019-12-12 | End: 2019-12-14

## 2019-12-12 RX ORDER — BUPIVACAINE HYDROCHLORIDE AND EPINEPHRINE 5; 5 MG/ML; UG/ML
INJECTION, SOLUTION EPIDURAL; INTRACAUDAL; PERINEURAL AS NEEDED
Status: DISCONTINUED | OUTPATIENT
Start: 2019-12-12 | End: 2019-12-12 | Stop reason: HOSPADM

## 2019-12-12 RX ORDER — SODIUM CHLORIDE 9 MG/ML
INJECTION, SOLUTION INTRAVENOUS CONTINUOUS
Status: DISCONTINUED | OUTPATIENT
Start: 2019-12-12 | End: 2019-12-14

## 2019-12-12 RX ORDER — ATORVASTATIN CALCIUM 20 MG/1
20 TABLET, FILM COATED ORAL NIGHTLY
Status: DISCONTINUED | OUTPATIENT
Start: 2019-12-12 | End: 2019-12-14

## 2019-12-12 RX ORDER — LISINOPRIL 10 MG/1
10 TABLET ORAL DAILY
COMMUNITY
End: 2020-01-19

## 2019-12-12 RX ORDER — ONDANSETRON 2 MG/ML
4 INJECTION INTRAMUSCULAR; INTRAVENOUS AS NEEDED
Status: DISCONTINUED | OUTPATIENT
Start: 2019-12-12 | End: 2019-12-12 | Stop reason: HOSPADM

## 2019-12-12 RX ORDER — CEFAZOLIN SODIUM/WATER 2 G/20 ML
SYRINGE (ML) INTRAVENOUS
Status: DISPENSED
Start: 2019-12-12 | End: 2019-12-12

## 2019-12-12 RX ORDER — CELECOXIB 200 MG/1
200 CAPSULE ORAL ONCE
Status: COMPLETED | OUTPATIENT
Start: 2019-12-12 | End: 2019-12-12

## 2019-12-12 RX ORDER — MORPHINE SULFATE 15 MG/1
15 TABLET ORAL EVERY 4 HOURS PRN
Status: DISCONTINUED | OUTPATIENT
Start: 2019-12-12 | End: 2019-12-13

## 2019-12-12 RX ORDER — DIPHENHYDRAMINE HYDROCHLORIDE 50 MG/ML
25 INJECTION INTRAMUSCULAR; INTRAVENOUS EVERY 4 HOURS PRN
Status: DISCONTINUED | OUTPATIENT
Start: 2019-12-12 | End: 2019-12-14

## 2019-12-12 RX ORDER — INSULIN ASPART 100 [IU]/ML
INJECTION, SOLUTION INTRAVENOUS; SUBCUTANEOUS ONCE
Status: COMPLETED | OUTPATIENT
Start: 2019-12-12 | End: 2019-12-12

## 2019-12-12 RX ORDER — DIAZEPAM 5 MG/1
5 TABLET ORAL EVERY 6 HOURS PRN
Status: DISCONTINUED | OUTPATIENT
Start: 2019-12-12 | End: 2019-12-14

## 2019-12-12 RX ORDER — HYDROMORPHONE HYDROCHLORIDE 1 MG/ML
INJECTION, SOLUTION INTRAMUSCULAR; INTRAVENOUS; SUBCUTANEOUS AS NEEDED
Status: DISCONTINUED | OUTPATIENT
Start: 2019-12-12 | End: 2019-12-12 | Stop reason: SURG

## 2019-12-12 RX ORDER — GABAPENTIN 600 MG/1
TABLET ORAL
Status: DISPENSED
Start: 2019-12-12 | End: 2019-12-12

## 2019-12-12 RX ORDER — SODIUM CHLORIDE, SODIUM LACTATE, POTASSIUM CHLORIDE, CALCIUM CHLORIDE 600; 310; 30; 20 MG/100ML; MG/100ML; MG/100ML; MG/100ML
INJECTION, SOLUTION INTRAVENOUS CONTINUOUS
Status: DISCONTINUED | OUTPATIENT
Start: 2019-12-12 | End: 2019-12-12 | Stop reason: HOSPADM

## 2019-12-12 RX ORDER — HYDROMORPHONE HYDROCHLORIDE 1 MG/ML
INJECTION, SOLUTION INTRAMUSCULAR; INTRAVENOUS; SUBCUTANEOUS
Status: COMPLETED
Start: 2019-12-12 | End: 2019-12-12

## 2019-12-12 RX ORDER — DEXAMETHASONE SODIUM PHOSPHATE 10 MG/ML
10 INJECTION, SOLUTION INTRAMUSCULAR; INTRAVENOUS EVERY 8 HOURS
Status: CANCELLED | OUTPATIENT
Start: 2019-12-12 | End: 2019-12-13

## 2019-12-12 RX ORDER — TIZANIDINE 4 MG/1
4 TABLET ORAL 3 TIMES DAILY PRN
Status: DISCONTINUED | OUTPATIENT
Start: 2019-12-12 | End: 2019-12-14

## 2019-12-12 RX ORDER — SODIUM PHOSPHATE, DIBASIC AND SODIUM PHOSPHATE, MONOBASIC 7; 19 G/133ML; G/133ML
1 ENEMA RECTAL ONCE AS NEEDED
Status: DISCONTINUED | OUTPATIENT
Start: 2019-12-12 | End: 2019-12-14

## 2019-12-12 RX ORDER — LIDOCAINE HYDROCHLORIDE 10 MG/ML
INJECTION, SOLUTION EPIDURAL; INFILTRATION; INTRACAUDAL; PERINEURAL AS NEEDED
Status: DISCONTINUED | OUTPATIENT
Start: 2019-12-12 | End: 2019-12-12 | Stop reason: SURG

## 2019-12-12 RX ORDER — OXYCODONE HYDROCHLORIDE 5 MG/1
5 TABLET ORAL EVERY 4 HOURS PRN
Status: DISCONTINUED | OUTPATIENT
Start: 2019-12-12 | End: 2019-12-13

## 2019-12-12 RX ORDER — SODIUM CHLORIDE, SODIUM LACTATE, POTASSIUM CHLORIDE, CALCIUM CHLORIDE 600; 310; 30; 20 MG/100ML; MG/100ML; MG/100ML; MG/100ML
INJECTION, SOLUTION INTRAVENOUS CONTINUOUS
Status: DISCONTINUED | OUTPATIENT
Start: 2019-12-12 | End: 2019-12-14

## 2019-12-12 RX ORDER — DIPHENHYDRAMINE HCL 25 MG
25 CAPSULE ORAL EVERY 4 HOURS PRN
Status: DISCONTINUED | OUTPATIENT
Start: 2019-12-12 | End: 2019-12-14

## 2019-12-12 RX ORDER — METOCLOPRAMIDE HYDROCHLORIDE 5 MG/ML
10 INJECTION INTRAMUSCULAR; INTRAVENOUS EVERY 6 HOURS PRN
Status: DISCONTINUED | OUTPATIENT
Start: 2019-12-12 | End: 2019-12-14

## 2019-12-12 RX ORDER — KETAMINE HYDROCHLORIDE 50 MG/ML
INJECTION, SOLUTION, CONCENTRATE INTRAMUSCULAR; INTRAVENOUS AS NEEDED
Status: DISCONTINUED | OUTPATIENT
Start: 2019-12-12 | End: 2019-12-12 | Stop reason: SURG

## 2019-12-12 RX ORDER — ONDANSETRON 2 MG/ML
INJECTION INTRAMUSCULAR; INTRAVENOUS AS NEEDED
Status: DISCONTINUED | OUTPATIENT
Start: 2019-12-12 | End: 2019-12-12 | Stop reason: SURG

## 2019-12-12 RX ORDER — NALOXONE HYDROCHLORIDE 0.4 MG/ML
80 INJECTION, SOLUTION INTRAMUSCULAR; INTRAVENOUS; SUBCUTANEOUS AS NEEDED
Status: DISCONTINUED | OUTPATIENT
Start: 2019-12-12 | End: 2019-12-12 | Stop reason: HOSPADM

## 2019-12-12 RX ORDER — HYDROMORPHONE HYDROCHLORIDE 1 MG/ML
0.4 INJECTION, SOLUTION INTRAMUSCULAR; INTRAVENOUS; SUBCUTANEOUS EVERY 5 MIN PRN
Status: DISCONTINUED | OUTPATIENT
Start: 2019-12-12 | End: 2019-12-12 | Stop reason: HOSPADM

## 2019-12-12 RX ORDER — MIDAZOLAM HYDROCHLORIDE 1 MG/ML
INJECTION INTRAMUSCULAR; INTRAVENOUS AS NEEDED
Status: DISCONTINUED | OUTPATIENT
Start: 2019-12-12 | End: 2019-12-12 | Stop reason: SURG

## 2019-12-12 RX ORDER — CLONAZEPAM 0.5 MG/1
0.5 TABLET ORAL NIGHTLY PRN
Status: DISCONTINUED | OUTPATIENT
Start: 2019-12-12 | End: 2019-12-14

## 2019-12-12 RX ORDER — PHENYLEPHRINE HCL 10 MG/ML
VIAL (ML) INJECTION AS NEEDED
Status: DISCONTINUED | OUTPATIENT
Start: 2019-12-12 | End: 2019-12-12 | Stop reason: SURG

## 2019-12-12 RX ORDER — POLYETHYLENE GLYCOL 3350 17 G/17G
17 POWDER, FOR SOLUTION ORAL DAILY PRN
Status: DISCONTINUED | OUTPATIENT
Start: 2019-12-12 | End: 2019-12-14

## 2019-12-12 RX ORDER — GABAPENTIN 600 MG/1
600 TABLET ORAL ONCE
Status: COMPLETED | OUTPATIENT
Start: 2019-12-12 | End: 2019-12-12

## 2019-12-12 RX ORDER — ACETAMINOPHEN 500 MG
1000 TABLET ORAL ONCE
Status: DISCONTINUED | OUTPATIENT
Start: 2019-12-12 | End: 2019-12-12 | Stop reason: HOSPADM

## 2019-12-12 RX ORDER — ONDANSETRON 2 MG/ML
4 INJECTION INTRAMUSCULAR; INTRAVENOUS EVERY 4 HOURS PRN
Status: ACTIVE | OUTPATIENT
Start: 2019-12-12 | End: 2019-12-13

## 2019-12-12 RX ORDER — VANCOMYCIN HYDROCHLORIDE 1 G/20ML
INJECTION, POWDER, LYOPHILIZED, FOR SOLUTION INTRAVENOUS AS NEEDED
Status: DISCONTINUED | OUTPATIENT
Start: 2019-12-12 | End: 2019-12-12 | Stop reason: HOSPADM

## 2019-12-12 RX ORDER — DEXAMETHASONE SODIUM PHOSPHATE 4 MG/ML
VIAL (ML) INJECTION AS NEEDED
Status: DISCONTINUED | OUTPATIENT
Start: 2019-12-12 | End: 2019-12-12 | Stop reason: SURG

## 2019-12-12 RX ADMIN — ONDANSETRON 4 MG: 2 INJECTION INTRAMUSCULAR; INTRAVENOUS at 12:06:00

## 2019-12-12 RX ADMIN — PHENYLEPHRINE HCL 100 MCG: 10 MG/ML VIAL (ML) INJECTION at 09:05:00

## 2019-12-12 RX ADMIN — CEFAZOLIN SODIUM/WATER 2 G: 2 G/20 ML SYRINGE (ML) INTRAVENOUS at 07:56:00

## 2019-12-12 RX ADMIN — LIDOCAINE HYDROCHLORIDE 25 MG: 10 INJECTION, SOLUTION EPIDURAL; INFILTRATION; INTRACAUDAL; PERINEURAL at 07:36:00

## 2019-12-12 RX ADMIN — SODIUM CHLORIDE, SODIUM LACTATE, POTASSIUM CHLORIDE, CALCIUM CHLORIDE: 600; 310; 30; 20 INJECTION, SOLUTION INTRAVENOUS at 12:42:00

## 2019-12-12 RX ADMIN — HYDROMORPHONE HYDROCHLORIDE 0.5 MG: 1 INJECTION, SOLUTION INTRAMUSCULAR; INTRAVENOUS; SUBCUTANEOUS at 07:46:00

## 2019-12-12 RX ADMIN — MIDAZOLAM HYDROCHLORIDE 2 MG: 1 INJECTION INTRAMUSCULAR; INTRAVENOUS at 07:34:00

## 2019-12-12 RX ADMIN — HYDROMORPHONE HYDROCHLORIDE 0.5 MG: 1 INJECTION, SOLUTION INTRAMUSCULAR; INTRAVENOUS; SUBCUTANEOUS at 10:40:00

## 2019-12-12 RX ADMIN — DEXAMETHASONE SODIUM PHOSPHATE 8 MG: 4 MG/ML VIAL (ML) INJECTION at 08:06:00

## 2019-12-12 RX ADMIN — CEFAZOLIN SODIUM/WATER 2 G: 2 G/20 ML SYRINGE (ML) INTRAVENOUS at 11:56:00

## 2019-12-12 RX ADMIN — KETAMINE HYDROCHLORIDE 50 MG: 50 INJECTION, SOLUTION, CONCENTRATE INTRAMUSCULAR; INTRAVENOUS at 08:09:00

## 2019-12-12 RX ADMIN — HYDROMORPHONE HYDROCHLORIDE 0.5 MG: 1 INJECTION, SOLUTION INTRAMUSCULAR; INTRAVENOUS; SUBCUTANEOUS at 07:38:00

## 2019-12-12 NOTE — ANESTHESIA POSTPROCEDURE EVALUATION
[address] [facility] Patient Status:  Surgery Admit - Inpt   Age/Gender 60year old male MRN [id number]   [facility] SURGERY Attending Kehinde Solano MD   Hosp Day # 0 PCP Silvina Nicholas MD       Anesthesia Post-op Note    Pro

## 2019-12-12 NOTE — ANESTHESIA PROCEDURE NOTES
Peripheral IV  Date/Time: 12/12/2019 8:11 AM  Inserted by: Debi Benitez MD    Placement  Needle size: 16 G  Laterality: right  Location: hand  Local anesthetic: none  Site prep: alcohol  Technique: anatomical landmarks  Attempts: 1

## 2019-12-12 NOTE — PROGRESS NOTES
S: Moderate back pain with no leg pain. No new numbness or weakness. No headaches. Inspection:  Awake alert No acute distress.  No difficulty breathing     Blood pressure 124/73, pulse 96, temperature 97.1 °F (36.2 °C), temperature source Temporal, r

## 2019-12-12 NOTE — PLAN OF CARE
Pt A & O x4 on 3L O2, /IS. SCDs, Gillis, ADA diet, Foam dressing CDI. Ice therapy. Last BM 12/12. IVF. Accu checks [facility]. Pt has footdrop in both feet, c/o numbness in L hand and bilateral feet, left leg knee down to foot. Family at bedside.  UPdated on P

## 2019-12-12 NOTE — ANESTHESIA PROCEDURE NOTES
Airway  Date/Time: 12/12/2019 7:40 AM  Urgency: elective      General Information and Staff    Patient location during procedure: OR  Anesthesiologist: Gurdeep Cheema MD    Indications and Patient Condition  Indications for airway management: anesth

## 2019-12-12 NOTE — H&P
Patient presents with:  Pre-Op Exam: pt is scheduled for  back  surgery on 12/12       ________________________________________________________________________  HPI:  Planned lumbar fusion  - 12/12/2019    Dr Jacques hanks s/p Hip replace INJECTION OR MEDIAL BRANCH NERVE BLOCK N/A 9/11/2012     Performed by Mary Huff MD at [address]   • OTHER         elbow and carpal tunnel    both arms   • OTHER   2014     back surgery   • TRANSFORAMINAL EPIDURAL - LUMBAR N/A 8/1 monthly        Comment: social on weekends       Drug use:  No     ________________________________________________________________________  ROS:  GENERAL HEALTH: feels well otherwise  Tolerated previous anesthesia      SKIN: denies any unusual skin lesions replacement, right  Stable        Patient is medically stable and cleared for surgery  -         The above referenced H&P was reviewed by Asmita Lu MD on 12/12/2019, and no significant changes have occurred in the patient's condition since the H&P was

## 2019-12-12 NOTE — ANESTHESIA PREPROCEDURE EVALUATION
PRE-OP EVALUATION    Patient Name: Chase Morgan    Pre-op Diagnosis: Kyphosis of thoracolumbar region, unspecified kyphosis type [M40.205]    Procedure(s):  Lumbar 2-Lumbar 3, Lumbar 3, Lumbar 4 elly-lateral fusion with Lumbar 2-Lumbar 3, Lumbar 3-Lumb NEEDED FOR ANXIETY, Disp: 90 tablet, Rfl: 0  metFORMIN HCl ER, MOD, 1000 MG (MOD) Oral Tablet 24 Hr, Take 2 tablets (2,000 mg total) by mouth 2 (two) times daily with meals.  May split into twice daily dose as tolerated, Disp: 180 tablet, Rfl: 3  ATORVASTAT MEDIAL BRANCH NERVE BLOCK N/A 9/11/2012    Performed by Marly Zambrano MD at [address]   • OTHER      elbow and carpal tunnel    both arms   • OTHER  2014    back surgery   • TRANSFORAMINAL EPIDURAL - LUMBAR N/A 8/16/2012    Performed

## 2019-12-12 NOTE — ANESTHESIA PROCEDURE NOTES
Arterial Line  Performed by: Dano Sánchez MD  Authorized by: Dano Sánchez MD     General Information and Staff    Procedure Start:  12/12/2019 8:35 AM  Procedure End:  12/12/2019 8:38 AM  Anesthesiologist:  Dano Sánchez MD  Per

## 2019-12-12 NOTE — CONSULTS
General Medicine Consult      Reason for consult: medical management    Consulted by: Dr. Norberto Ching    PCP: Rishabh Velazco MD      History of Present Illness: Patient is a 60year old male with mmp including but not limited to HTN, DM, diabetes, OA, is c EPIDURAL - LUMBAR N/A 7/11/2012    Performed by Maria Del Rosario Menezes MD at [facility] Medications:  Acetaminophen (ACETAMINOPHEN EXTRA STRENGTH) 500 MG Oral Cap, Take 1,000 mg by mouth one time.   , Disp: , Rfl:   atorvastatin 10 History    Tobacco Use      Smoking status: Never Smoker      Smokeless tobacco: Never Used    Alcohol use: Yes      Frequency: 2-3 times a week      Drinks per session: 3 or 4      Binge frequency: Less than monthly      Comment: social on weekends monitor    **PPx-scds, no ppx AC given recent spine surgery        Outpatient records or previous hospital records reviewed. Further recommendations pending patient's clinical course.   [facility] hospitalist to continue to follow patient while in house    Marcio Ji

## 2019-12-12 NOTE — BRIEF OP NOTE
Pre-Operative Diagnosis: Kyphosis of thoracolumbar region, unspecified kyphosis type [M40.205]     Post-Operative Diagnosis: Kyphosis of thoracolumbar region, unspecified kyphosis type [M40.205]      Procedure Performed:   Procedure(s):  Lumbar 2-Lumbar 3,

## 2019-12-13 PROCEDURE — 82962 GLUCOSE BLOOD TEST: CPT

## 2019-12-13 PROCEDURE — 85027 COMPLETE CBC AUTOMATED: CPT | Performed by: PHYSICIAN ASSISTANT

## 2019-12-13 PROCEDURE — 97535 SELF CARE MNGMENT TRAINING: CPT

## 2019-12-13 PROCEDURE — 97530 THERAPEUTIC ACTIVITIES: CPT

## 2019-12-13 PROCEDURE — 97165 OT EVAL LOW COMPLEX 30 MIN: CPT

## 2019-12-13 PROCEDURE — 97161 PT EVAL LOW COMPLEX 20 MIN: CPT

## 2019-12-13 RX ORDER — HYDROCODONE BITARTRATE AND ACETAMINOPHEN 10; 325 MG/1; MG/1
1 TABLET ORAL EVERY 6 HOURS PRN
Status: DISCONTINUED | OUTPATIENT
Start: 2019-12-13 | End: 2019-12-14

## 2019-12-13 RX ORDER — HYDROCODONE BITARTRATE AND ACETAMINOPHEN 10; 325 MG/1; MG/1
2 TABLET ORAL EVERY 6 HOURS PRN
Status: DISCONTINUED | OUTPATIENT
Start: 2019-12-13 | End: 2019-12-14

## 2019-12-13 NOTE — PHYSICAL THERAPY NOTE
PHYSICAL THERAPY QUICK EVALUATION - INPATIENT    Room Number: 359/359-A  Evaluation Date: 12/13/2019  Presenting Problem: S/p L2-L3,L3-L4 Anterolateral Fusion with L2-L3,L3-L4,L4-L5,L5-S1 Revision Posterior fusion on 12/12/19  Physician Order: PT Eval an [facility]   • TRANSFORAMINAL EPIDURAL - LUMBAR N/A 7/11/2012    Performed by Yevgeniy Key MD at [address]  Type of Home: House   Home Layout: Two level  Stairs to Enter : 2  Railing: Yes  Stairs adjusting bedclothes, sheets and blankets)?: None   -   Sitting down on and standing up from a chair with arms (e.g., wheelchair, bedside commode, etc.): A Little   -   Moving from lying on back to sitting on the side of the bed?: None   How much help from L2-L3,L3-L4 Anterolateral Fusion with L2-L3,L3-L4,L4-L5,L5-S1 Revision Posterior fusion on 12/12/19.   Pertinent comorbidities and personal factors impacting therapy include HTN,DM,Bilateral TKA,Right PADMINI in July 2019,Bilateral foot drop   Functional outcom

## 2019-12-13 NOTE — OCCUPATIONAL THERAPY NOTE
OCCUPATIONAL THERAPY QUICK EVALUATION - INPATIENT    Room Number: 359/359-A  Evaluation Date: 12/13/2019     Type of Evaluation: Quick Eval  Presenting Problem: (L2-4 anterolat fusion , L2-S1 revision posterior fusion)    Physician Order: IP Consult to Occ • TRANSFORAMINAL EPIDURAL - LUMBAR N/A 8/16/2012    Performed by Lissett Nelson MD at [facility]   • TRANSFORAMINAL EPIDURAL - LUMBAR N/A 7/26/2012    Performed by Hunter Corea MD at [facility] taking off regular lower body clothing?: A Little   -   Bathing (including washing, rinsing, drying)?: A Little  -   Toileting, which includes using toilet, bedpan or urinal? : A Little  -   Putting on and taking off regular upper body clothing?: None  - profile noted above. Functional outcome measures completed include AMPAC . In this OT evaluation patient presents with the following performance deficits: decreased balance, B footdrop, spine precautions .  These deficits impact the patient’s ability to par

## 2019-12-13 NOTE — PROGRESS NOTES
S: he has controlled back pain no leg pain. He is happy    Inspection:  Awake alert No acute distress. No difficulty breathing     Blood pressure 126/61, pulse 97, temperature 98.6 °F (37 °C), temperature source Oral, resp.  rate 20, height 5' 10\" (1.778

## 2019-12-13 NOTE — PLAN OF CARE
POD#1, A/Ox4, RA, , IS encouraged, BRANDON's, SCD's, pre-op footdrop, unable to dorsiflex, mendes intact, IVF, Gel packs to back, chair back brace OOB, 1800 ADA, LBM 12/12/19, pain medication administered, educated on pain control, to ambulate QID, encourage

## 2019-12-13 NOTE — PROGRESS NOTES
[facility] Hospitalist Progress Note     PCP: Kassandra Llanos MD    CC:  Follow up    SUBJECTIVE:  No CP, SOB, or N/V.   Pain manageable. +U, +flatus  OBJECTIVE:  Temp:  [97.1 °F (36.2 °C)-98.7 °F (37.1 °C)] 98.6 °F (37 °C)  Pulse:  [] 82  Resp:  [10-2 **OR** Glucose-Vitamin C **OR** dextrose **OR** glucose **OR** Glucose-Vitamin C       Assessment/Plan:     Active Problems:    Kyphosis of thoracolumbar region, unspecified kyphosis type      **Expected pain  -IV dilaudid prn, oxy prn-encourage transition

## 2019-12-14 VITALS
DIASTOLIC BLOOD PRESSURE: 74 MMHG | RESPIRATION RATE: 18 BRPM | WEIGHT: 251.31 LBS | HEART RATE: 103 BPM | OXYGEN SATURATION: 94 % | TEMPERATURE: 98 F | BODY MASS INDEX: 35.98 KG/M2 | HEIGHT: 70 IN | SYSTOLIC BLOOD PRESSURE: 134 MMHG

## 2019-12-14 PROCEDURE — 85027 COMPLETE CBC AUTOMATED: CPT | Performed by: HOSPITALIST

## 2019-12-14 PROCEDURE — 82962 GLUCOSE BLOOD TEST: CPT

## 2019-12-14 RX ORDER — TIZANIDINE 4 MG/1
4 TABLET ORAL 3 TIMES DAILY PRN
Qty: 30 TABLET | Refills: 1 | Status: SHIPPED | OUTPATIENT
Start: 2019-12-14 | End: 2020-06-05

## 2019-12-14 NOTE — PLAN OF CARE
POD 2 Lumbar revision / fusion, Pt is AAOX4, room air, VSS, SL, voiding freely to urinal, up with PT /OT today, BRANDON's, SCD's, glucose monitored and treated per orders, LSO brace when OOB, PO meds for pain control, gel ice as needed, Hx of drop foot to bi-l

## 2019-12-14 NOTE — PROGRESS NOTES
S: Minimal back pain with no leg pain. No headaches. No leg pain. No new numbness. No other concerns. He wants to go home today. He is happy with his results. Inspection:  Awake alert No acute distress.  No difficulty breathing     Blood pressure

## 2019-12-14 NOTE — PLAN OF CARE
Patient reports feeling ok, pain is well controlled on PO pain medication, Dressing is intact , was changed earlier this am. He is ready to be discharged today.

## 2019-12-14 NOTE — PLAN OF CARE
Pain managed with oral pain medications. Microfoam dressing to back and right flank dry and intact. Up with chair-back brace.  Pedal pulses + , continues to have bilateral foot drop, has AFO brace with him, not wearing, unable to dorsi- flex both feet but a

## 2019-12-15 NOTE — PROGRESS NOTES
[facility] Hospitalist Progress Note     PCP: Remi Slater MD    CC:  Follow up    SUBJECTIVE:  Sitting in chair, pain ok, no n/v. Using IS. +u/o. Some flatus. Walking.      OBJECTIVE:  Temp:  [98 °F (36.7 °C)-98.8 °F (37.1 °C)] 98.2 °F (36.8 °C)  Pulse: benzocaine-menthol, clonazePAM, glucose **OR** Glucose-Vitamin C **OR** dextrose **OR** glucose **OR** Glucose-Vitamin C       Assessment/Plan:     Active Problems:    Kyphosis of thoracolumbar region, unspecified kyphosis type      **Expected pain  -IV di

## 2019-12-16 NOTE — OPERATIVE REPORT
[facility]    PATIENT'S NAME: Sam Mccallum   ATTENDING PHYSICIAN: Conrad Foster M.D. OPERATING PHYSICIAN: Conrad Foster M.D.    PATIENT ACCOUNT#:   [de-identified]    LOCATION:  [address] [place] [address]  MEDICAL RECORD #:   [id number]       DATE OF BIRTH:  [birth year] marked pedicles at all levels outlined above bilaterally. Two separate incisions 1-1/2 fingerbreadths lateral to this approximately an inch and a half long were made with a larger incision on the patient's more symptomatic side.   This was done with a #10 The fusion was thoroughly explored using curettes and pituitaries. Pre-operative measurements documenting a mismatch between pelvic incidence and lumbar lordosis was made, thus documenting clinically significant kyphosis.     Our attention first turned prior surgery at this level, an extensive lysis of adhesions ventrally and dorsally was required to mobilize the nerve roots. This required fine curets and Kolb used in sequential fashion with 2 and 3 mm Kerrison rongeurs.   This procedure required exten hemilaminotomy versus undercutting hemilaminectomy. This was done in an undercutting fashion and done to examine the central and contralateral neural elements facilitating central bilateral decompression as well.   An extraforaminal and transfacet decompre ball in place, we excised ligamentum flavum through the bone at the cranial foramina as needed, facilitating a serial cranial decompression and undercutting  microforaminotomy and hemilaminotomy versus undercutting hemilaminectomy.   This was done in an und was undertaken thus creating a bilateral decompression and bilateral microforaminotomy and hemilaminotomy using undercutting technique.   With undercutting technique and a Kolb ball in place, we excised ligamentum flavum through the bone at the cranial fo tighteners were applied bilaterally, and final tightening was applied with compression devices to make use of the osteotomy, thereby reducing kyphosis and pelvic incidence and lordosis mismatch.   X-rays confirmed appropriate localization of our instrumenta

## 2019-12-23 NOTE — DISCHARGE SUMMARY
[facility]  Discharge Summary    Jesusita Demarco Patient Status:  Inpatient    1960 MRN [id number]   [facility] 3SW-A Attending No att. providers found   Hosp Day # 2 PCP Nelson Johnson MD     Date of Admission: 2019    D Medication List as of 12/14/2019  2:37 PM    START taking these medications    tiZANidine HCl 4 MG Oral Tab  Take 1 tablet (4 mg total) by mouth 3 (three) times daily as needed., Normal, Disp-30 tablet, R-1      CONTINUE these medications which have NOT [facility]

## 2019-12-27 NOTE — OPERATIVE REPORT
[facility]    PATIENT'S NAME: Alex Browne   ATTENDING PHYSICIAN: Cookie Perez M.D. OPERATING PHYSICIAN: Cookie Perez M.D.    PATIENT ACCOUNT#:   [de-identified]    LOCATION:  [address] [place] [address]  MEDICAL RECORD #:   [id number]       DATE OF BIRTH:  [birth year] operating room for AP and lateral to be marked for the above levels. The flank was sterilely prepped and draped. A posterior incision was first made one finger length posterior to the lateral incision. This was done with a #10 blade.   Bovie electrocaute at the level of the disc. This was indicated in the neurophysiologist's report. Intradiscal toñito was then impacted into position. Any remaining psoas material was dissected anteriorly; the retractor was widened.   AP and lateral x-ray confirmed appropria into position. We then lengthened our incision more cranially and completed the same steps cranially to the next level, being L2-3.   Using the same methods using dilators, a posterior incision and the dilators being guided directly under psoas, using li Under fluoroscopic guidance, we targeted the lateral aspect of the pedicles bilaterally. These I-PAS needles were then impacted medially and then checked under lateral fluoroscopy for being in appropriate position.   Bone marrow was aspirated by placing a to caudal pedicle using bur, Kerrison rongeurs, and other instruments. Neural elements were not harmed during this process.   The osteotomy facilitated reduction of the patient's kyphosis by allowing for compression via pedicle screws at the conclusion of and applied. All tissues were retracted out of harm's way with regards to muscle in the paraspinal plane, and an articulating arm was attached to the retractor. Bovie electrocautery was used to remove remaining tissue over the pars and facet.   Pre-operat Our attention then turned to the adjacent L4-5 level where we repeated the same steps. The retractor apparatus was placed over the hoop shims. A medial retractor was sized and applied.   All tissues were retracted out of harm's way with regards to mus required extensive time and expertise, lengthening the time of the operation. This process was required to complete the necessary decompression of nerve roots at this level.        Our attention then turned to the adjacent L5-S1 level where we repeated the ventrally and dorsally was required to mobilize the nerve roots. This required fine curets and Kolb used in sequential fashion with 2 and 3 mm Kerrison rongeurs.   This procedure required extensive time and expertise, lengthening the time of the operatio conclusion of the case.     Dictated By Ander Mccray M.D.  d: 12/14/2019 15:08:25  t: 12/27/2019 11:03:20  Job [id number]  [facility]/

## 2020-08-24 PROBLEM — Z28.21 REFUSED INFLUENZA VACCINE: Status: ACTIVE | Noted: 2020-08-24

## 2020-08-25 PROBLEM — R93.1 AGATSTON CAC SCORE 200-399: Status: ACTIVE | Noted: 2020-08-25

## 2021-04-11 DIAGNOSIS — Z23 NEED FOR VACCINATION: ICD-10-CM

## 2021-09-27 PROBLEM — I63.9 ISCHEMIC STROKE (HCC): Status: ACTIVE | Noted: 2021-06-20

## 2021-12-10 PROBLEM — G93.89 BRAIN MASS: Status: ACTIVE | Noted: 2021-06-18

## 2022-06-20 NOTE — ANESTHESIA POSTPROCEDURE EVALUATION
[facility] TARYN Demarco (Scott) Patient Status:  Surgery Admit - Inpt   Age/Gender 60year old male MRN [id number]   [facility] SUR[facility]ERY Attending Benita Thomas MD   [facility] Day # 0 PCP Josefina Cheatham MD       Anesthesia Post-op Note Wartpeel Counseling:  I discussed with the patient the risks of Wartpeel including but not limited to erythema, scaling, itching, weeping, crusting, and pain.

## 2025-02-18 NOTE — PLAN OF CARE
Problem: Impaired Functional Mobility  Goal: Achieve highest/safest level of mobility/gait  Description  Interventions:  - Assess patient's functional ability and stability  - Promote increasing activity/tolerance for mobility and gait  - Educate and eng Occasional moan or groan. Low-level speech with a negative or disapproving quality Occasional moan or groan. Low-level speech with a negative or disapproving quality

## 2025-04-04 ENCOUNTER — TELEPHONE (OUTPATIENT)
Dept: SURGERY | Facility: CLINIC | Age: 65
End: 2025-04-04

## 2025-04-04 NOTE — TELEPHONE ENCOUNTER
Patient called back to schedule with Eliceo ENGEL  for   Future Appointments   Date Time Provider Department Center   4/15/2025 11:00 AM Eliceo Briones PA EMG ORTH [facility]

## 2025-04-04 NOTE — TELEPHONE ENCOUNTER
Called patient and LVM.  Advised patient that provider cannot place any orders or make any recommendations without being evaluated first.   Provided patient w/ office number to CB to schedule w/ spine PA-C.    H/o L2-L4 elly lateral fusion with L2-S1 revision post fusion 12/12/19 w/ Dr. Church

## 2025-04-04 NOTE — TELEPHONE ENCOUNTER
Patient has been an patient of Dr. Church's for a long time and is calling to see if he can order him some new AFO's.  Please advise.

## 2025-04-14 ENCOUNTER — OFFICE VISIT (OUTPATIENT)
Age: 65
End: 2025-04-14
Payer: MEDICARE

## 2025-04-14 VITALS — WEIGHT: 209 LBS | HEIGHT: 70 IN | BODY MASS INDEX: 29.92 KG/M2

## 2025-04-14 DIAGNOSIS — M21.371 BILATERAL FOOT-DROP: Primary | ICD-10-CM

## 2025-04-14 DIAGNOSIS — M21.372 BILATERAL FOOT-DROP: Primary | ICD-10-CM

## 2025-04-14 PROCEDURE — 99203 OFFICE O/P NEW LOW 30 MIN: CPT | Performed by: PHYSICIAN ASSISTANT

## 2025-04-14 RX ORDER — ASPIRIN 81 MG/1
81 TABLET ORAL DAILY
COMMUNITY
Start: 2022-05-27

## 2025-04-14 RX ORDER — SPIRONOLACTONE 25 MG/1
TABLET ORAL
COMMUNITY

## 2025-04-14 RX ORDER — THERA TABS 400 MCG
1 TAB ORAL DAILY
COMMUNITY
Start: 2025-02-26

## 2025-04-14 RX ORDER — DABIGATRAN ETEXILATE 150 MG/1
CAPSULE ORAL
COMMUNITY
Start: 2024-12-23

## 2025-04-14 RX ORDER — MELATONIN
1000 DAILY
COMMUNITY
Start: 2025-03-11

## 2025-04-14 RX ORDER — LIDOCAINE HYDROCHLORIDE 20 MG/ML
1 SOLUTION ORAL; TOPICAL
COMMUNITY
Start: 2025-03-10

## 2025-04-14 RX ORDER — ACETAMINOPHEN 325 MG/1
2 TABLET ORAL EVERY 6 HOURS PRN
COMMUNITY
Start: 2025-02-26

## 2025-04-14 RX ORDER — ROSUVASTATIN CALCIUM 20 MG/1
20 TABLET, COATED ORAL NIGHTLY
COMMUNITY
Start: 2025-02-26

## 2025-04-14 RX ORDER — FUROSEMIDE 40 MG/1
40 TABLET ORAL 2 TIMES DAILY
COMMUNITY
Start: 2025-03-25

## 2025-04-14 NOTE — PROGRESS NOTES
Patient: Jean Demarco  Medical Record Number: [id number]  Site: [facility]  Referring Physician:  No ref. provider found  PCP: Enio Cunningham MD        HISTORY OF CHIEF COMPLAINT:    Jean Demarco is a 64 year old male, who complains of many h/o bilateral foot drop.  Denies saddle anesthesia or incontinence.  Patient has a long history of bilateral foot drop.  We did a prior L2-S1 fusion on him in 2019.  He states he has no pain complaints.  He has low back stiffness.  No new numbness.  His only complaint is his bilateral foot drop he still has from prior to surgery.  He is here for new AFOs.  His AFOs that he previously had are worn out and the straps are not functioning properly.    VAS Pain Score: 0 /10        Past Medical History[1]   Past Surgical History[2]   Family History[3]   Social Hx on file[4]   Current Medications:  Current Medications[5]           PHYSICAL EXAMIMATION   PHYSICAL EXAMINATION: Jean Demarco is a 64 year old   male who is observed sitting comfortably in the exam room alert and oriented times three. RESPIRATORY RATE: 18 He looks consistent his stated age.    Ht 5' 10\" (1.778 m)   Wt 209 lb (94.8 kg)   BMI 29.99 kg/m²   The patient is well developed, well nourished, thin body habitus, well muscled.       Inspection: No acute distress.   Patient displays non - antalgic gait, and is unable to normal heel walk, normal toe walk.     ROM:  Forward Flexion  Pain free    Extension  Pain free    Palpation:  See chart below:  Palpation of Lumbar Area Right   (POS or NEG) Left   (POS or NEG)   Lumbar paraspinals Neg Neg   SIJ Neg Neg   PSIS Neg Neg   Trochanteric Bursa Neg Neg     Strength:      DTR's:     Left Right    Left Right    EHL 0/5 0/5  Patella  2+/4 2+/4    DF 0/5 0/5  Achilles  2+/4 2+/4    PF 5/5 5/5    Quad 4+/5 5/5    IP 5/5 5/5    Sensation:  Sensory deficits noted on bilateral lower extremities to light touch: none    Tests:  Test Right   (POS or NEG) Left   (POS  or NEG)   SLR Neg Neg   Clonus Neg Neg      Calves supple, NT   MUSCULOSKELETAL: Fluid, pain-free ROM to bilateral: ankles, knees  & hips                                                                                     MEDICAL DECISION MAKING:   Impression: S/p L2-S1 fusion  Bilateral foot drop    Plan: We discussed the diagnosis and treatment options today, including rationale for surgical vs non-surgical options.  The patient has a bilateral foot drop.  His previous AFOs that he brought today have broken straps and the bottoms have worn them.  I put an order for new AFOs to be given to him.  His concerns were addressed.  He is doing great from a surgical perspective.  He has no pain complaints.  He was able to golf last year with the help of his AFOs.  He is pleased with the results of the surgery.  Restrictions and limitations were reviewed with the patient.  Concerns were addressed.  Questions were encouraged and answered.  Patient voiced understanding.  Images were reviewed and discussed with the patient.  They voiced understanding of what the images showed.      The patient indicates understanding of these issues and agrees to the plan.    Thank you very much.     Respectfully yours,    Eliceo Briones PA-C    This note was dictated using Dragon software. While it was briefly proofread prior to completion, some grammatical, spelling, and word choice errors due to dictation may still occur.       [1]   Past Medical History:   Anesthesia complication    out of several surgerys ~ 1 anesthesiologist siad his trachea was small    Anxiety state, unspecified    Back problem    kyphosis    Depression    1998    Diabetes (HCC)    Disorder of thyroid    High blood pressure    Osteoarthritis    Unspecified essential hypertension    treated for 20 years     Visual impairment    reading glasses   [2]   Past Surgical History:  Procedure Laterality Date    Back surgery  6-18-09    L4-S1 PLIF    Back surgery  2008     laminectomy    Fluor gid & loclzj ndl/cath spi dx/ther njx  8/16/2012    Procedure: TRANSFORAMINAL EPIDURAL - LUMBAR;  Surgeon: Ranjit Heaton MD;  Location: [facility]    Hip surgery Right 07/24/2019    total hip replacement     Inj paravert f jnt l/s 1 lev  9/11/2012    Procedure: LUMBAR FACET INJECTION OR MEDIAL BRANCH NERVE BLOCK;  Surgeon: Cole Stephenson MD;  Location: [facility]    Inj paravert f jnt l/s 2 lev  9/11/2012    Procedure: LUMBAR FACET INJECTION OR MEDIAL BRANCH NERVE BLOCK;  Surgeon: Cole Stephenson MD;  Location: [facility]    Injection, anesthetic/steroid, transforaminal epidural; lumbar/sacral, single level  7/11/2012    Procedure: TRANSFORAMINAL EPIDURAL - LUMBAR;  Surgeon: Cole Stephenson MD;  Location: [facility]    Injection, anesthetic/steroid, transforaminal epidural; lumbar/sacral, single level  7/26/2012    Procedure: TRANSFORAMINAL EPIDURAL - LUMBAR;  Surgeon: Cole Stephenson MD;  Location: [facility]    Injection, w/wo contrast, dx/therapeutic substance, epidural/subarachnoid; lumbar/sacral  8/16/2012    Procedure: TRANSFORAMINAL EPIDURAL - LUMBAR;  Surgeon: Ranjit Heaton MD;  Location: [facility]    Knee replacement surgery  2008    right knee    Knee replacement surgery  Dec 2012     Left knee    Other      elbow and carpal tunnel    both arms    Other  2014    back surgery   [3]   Family History  Problem Relation Age of Onset    Diabetes Father     Heart Disorder Sister         stroke   [4]   Social History  Socioeconomic History    Marital status:    Tobacco Use    Smoking status: Never    Smokeless tobacco: Never   Vaping Use    Vaping status: Never Used   Substance and Sexual Activity    Alcohol use: Yes     Comment: social on weekends     Drug use: No   [5]   Current Outpatient Medications   Medication Sig Dispense Refill    FEROSUL 325 (65 Fe)  MG Oral Tab Take 1 tablet (325 mg total) by mouth daily with breakfast.      dabigatran 150 MG Oral Cap       spironolactone 25 MG Oral Tab       rosuvastatin 20 MG Oral Tab Take 1 tablet (20 mg total) by mouth nightly.      furosemide 40 MG Oral Tab Take 1 tablet (40 mg total) by mouth 2 (two) times daily.      Multiple Vitamin (THERA) Oral Tab Take 1 tablet by mouth daily.      aspirin 81 MG Oral Tab EC Take 1 tablet (81 mg total) by mouth daily.      acetaminophen 325 MG Oral Tab Take 2 tablets (650 mg total) by mouth every 6 (six) hours as needed.      cyanocobalamin 1000 MCG Oral Tab Take 1 tablet (1,000 mcg total) by mouth daily.      clonazePAM 0.5 MG Oral Tab Take 1 tablet (0.5 mg total) by mouth nightly. 30 tablet 2    SITagliptin Phosphate (JANUVIA) 100 MG Oral Tab Take 1 tablet (100 mg total) by mouth daily. 90 tablet 0    venlafaxine 75 MG Oral Capsule SR 24 Hr Take 1 capsule (75 mg total) by mouth 2 (two) times daily. 180 capsule 0    metFORMIN HCl ER, MOD, 1000 MG (MOD) Oral Tablet 24 Hr Take 2 tablets (2,000 mg total) by mouth daily. 180 tablet 3    metoprolol succinate 25 MG Oral Tablet 24 Hr Take 1 tablet (25 mg total) by mouth daily. (Patient not taking: Reported on 4/14/2025) 90 tablet 3

## 2025-04-21 ENCOUNTER — TELEPHONE (OUTPATIENT)
Facility: CLINIC | Age: 65
End: 2025-04-21

## 2025-04-21 NOTE — TELEPHONE ENCOUNTER
Patient received forms from social security about his placard. He would like to know if he can drop off the forms in [place] or should it be [place].      Please advise.

## 2025-04-22 NOTE — TELEPHONE ENCOUNTER
Called and spoke w/ patient.   Patient will drop off temporary handicap parking placard forms at [place] by end of this week.

## 2025-06-09 ENCOUNTER — TELEPHONE (OUTPATIENT)
Dept: ORTHOPEDICS CLINIC | Facility: CLINIC | Age: 65
End: 2025-06-09

## 2025-06-09 NOTE — TELEPHONE ENCOUNTER
Called patient and KARAN MCCARTHY parking placard forms completed and mailed to patient on 6/2/25.

## 2025-06-09 NOTE — TELEPHONE ENCOUNTER
Patient is following up on his request for the handicap parking placard, he states that it was going to be mailed out to him once it was filled out. Please advise.

## (undated) DEVICE — KIT SPNL XLIF NRVSN DIL M5

## (undated) DEVICE — SUTURE VICRYL 1 OS-6

## (undated) DEVICE — DRAPE,U/SHT,SPLIT,FILM,60X84,STERILE: Brand: MEDLINE

## (undated) DEVICE — 3.0MM PRECISION ROUND

## (undated) DEVICE — GOWN SURG AERO CHROME XXL

## (undated) DEVICE — STERILE POLYISOPRENE POWDER-FREE SURGICAL GLOVES: Brand: PROTEXIS

## (undated) DEVICE — Device

## (undated) DEVICE — DRILL SRG OIL CRTDG MAESTRO

## (undated) DEVICE — LAMINECTOMY ARM CRADLE FOAM POSITIONER: Brand: CARDINAL HEALTH

## (undated) DEVICE — LIGHT HANDLE

## (undated) DEVICE — 3M™ MICROFOAM™ TAPE 1528-4: Brand: 3M™ MICROFOAM™

## (undated) DEVICE — SUTURE VICRYL 2-0 FSL

## (undated) DEVICE — HOOD, PEEL-AWAY: Brand: FLYTE

## (undated) DEVICE — BLADE ELECTRODE: Brand: EDGE

## (undated) DEVICE — Device: Brand: PLUMEPEN

## (undated) DEVICE — RELINE POWER

## (undated) DEVICE — 11.1-M5 MULTIMODALITY KIT 5

## (undated) DEVICE — MLPD DISPOSABLE PAD (6' ROLL) 3 ROLLS: Brand: SCHAERER MEDICAL USA

## (undated) DEVICE — DRAPE TABLE COVER 44X90 TC-10

## (undated) DEVICE — WRAP COOLING HIP W/ICE PILLOW

## (undated) DEVICE — SUTURE VICRYL 2-0 CP-1

## (undated) DEVICE — WRAP COOLING BACK W/NO PILLOW

## (undated) DEVICE — COVER,MAYO STAND,STERILE: Brand: MEDLINE

## (undated) DEVICE — KIT POSITIONING PROAXIS PRONEV

## (undated) DEVICE — DRAPE,T,LAPARO,TRANS,STERILE: Brand: MEDLINE

## (undated) DEVICE — ROD BENDING DIGITIZER ARRAY

## (undated) DEVICE — SUTURE ETHIBOND 5 CCS

## (undated) DEVICE — FLOSEAL HEMOSTATIC MATRIX, 5ML: Brand: FLOSEAL HEMOSTATIC MATRIX

## (undated) DEVICE — NUVAMAP O.R. TECHNOLOGY

## (undated) DEVICE — Device: Brand: STABLECUT®

## (undated) DEVICE — KIT ACCESS MAXCESS 4

## (undated) DEVICE — NVM5 XLIF ACTIVATOR ELEC KIT

## (undated) DEVICE — PILLOW ABDUCTION HIP SM

## (undated) DEVICE — STERILE HOOK LOCK LATEX FREE ELASTIC BANDAGE 6INX5YD: Brand: HOOK LOCK™

## (undated) DEVICE — DERMABOND LIQUID ADHESIVE

## (undated) DEVICE — KENDALL SCD EXPRESS SLEEVES, KNEE LENGTH, MEDIUM: Brand: KENDALL SCD

## (undated) DEVICE — DRAPE C-ARM UNIVERSAL

## (undated) DEVICE — TRANSPOSAL ULTRAFLEX DUO/QUAD ULTRA CART MANIFOLD

## (undated) DEVICE — UNDYED BRAIDED (POLYGLACTIN 910), SYNTHETIC ABSORBABLE SUTURE: Brand: COATED VICRYL

## (undated) DEVICE — DRESSING AQUACEL AG 3.5 X 10

## (undated) DEVICE — C-ARMOR C-ARM EQUIPMENT COVERS CLEAR STERILE UNIVERSAL FIT 12 PER CASE: Brand: C-ARMOR

## (undated) DEVICE — TOTAL HIP CDS: Brand: MEDLINE INDUSTRIES, INC.

## (undated) DEVICE — INTENDED TO AID IN THE PASSING OF SUTURES THROUGH BONE AND SOFT TISSUE DURING ORTHOPEDIC SURGERY: Brand: HOFFEE SUTURE RETRIEVER

## (undated) DEVICE — NVM5 PROBE SNGL USE STER PKG

## (undated) DEVICE — LAMINECTOMY CDS: Brand: MEDLINE INDUSTRIES, INC.

## (undated) DEVICE — WIRE FX PRCPT KRSH BVL BLNT

## (undated) DEVICE — MAXCESS MAS TLIF 2 KIT ACCESS

## (undated) DEVICE — SOL  .9 1000ML BTL

## (undated) NOTE — LETTER
[facility]  Phone: [phone]  Right Fax: [phone]  [facility] 20 By: IFTIKHAR Newell RN Date: 7/22/19    Patient Name: Serachetanmanuel Oconnell)  Surgery Date: 7/24/2019    CSN: [id number]  Medical Record: [id number]